# Patient Record
Sex: FEMALE | Race: WHITE | URBAN - METROPOLITAN AREA
[De-identification: names, ages, dates, MRNs, and addresses within clinical notes are randomized per-mention and may not be internally consistent; named-entity substitution may affect disease eponyms.]

---

## 2016-07-14 PROBLEM — Z98.890: Noted: 2019-06-25

## 2016-07-14 PROBLEM — H02.834: Noted: 2019-06-25

## 2016-07-14 PROBLEM — H02.831: Noted: 2019-06-25

## 2017-01-18 ENCOUNTER — IMPORTED ENCOUNTER (OUTPATIENT)
Dept: URBAN - METROPOLITAN AREA CLINIC 50 | Facility: CLINIC | Age: 77
End: 2017-01-18

## 2017-07-25 ENCOUNTER — IMPORTED ENCOUNTER (OUTPATIENT)
Dept: URBAN - METROPOLITAN AREA CLINIC 50 | Facility: CLINIC | Age: 77
End: 2017-07-25

## 2018-05-17 ENCOUNTER — IMPORTED ENCOUNTER (OUTPATIENT)
Dept: URBAN - METROPOLITAN AREA CLINIC 50 | Facility: CLINIC | Age: 78
End: 2018-05-17

## 2018-07-03 ENCOUNTER — IMPORTED ENCOUNTER (OUTPATIENT)
Dept: URBAN - METROPOLITAN AREA CLINIC 50 | Facility: CLINIC | Age: 78
End: 2018-07-03

## 2019-01-07 ENCOUNTER — IMPORTED ENCOUNTER (OUTPATIENT)
Dept: URBAN - METROPOLITAN AREA CLINIC 50 | Facility: CLINIC | Age: 79
End: 2019-01-07

## 2019-01-07 NOTE — PATIENT DISCUSSION
"""Continue Artificial tears both eyes two - four times a day ."" ""Stop Restasis both eyes twice a day . """

## 2019-01-30 ENCOUNTER — IMPORTED ENCOUNTER (OUTPATIENT)
Dept: URBAN - METROPOLITAN AREA CLINIC 50 | Facility: CLINIC | Age: 79
End: 2019-01-30

## 2019-03-20 ENCOUNTER — IMPORTED ENCOUNTER (OUTPATIENT)
Dept: URBAN - METROPOLITAN AREA CLINIC 50 | Facility: CLINIC | Age: 79
End: 2019-03-20

## 2019-03-27 ENCOUNTER — IMPORTED ENCOUNTER (OUTPATIENT)
Dept: URBAN - METROPOLITAN AREA CLINIC 50 | Facility: CLINIC | Age: 79
End: 2019-03-27

## 2019-04-12 ENCOUNTER — IMPORTED ENCOUNTER (OUTPATIENT)
Dept: URBAN - METROPOLITAN AREA CLINIC 50 | Facility: CLINIC | Age: 79
End: 2019-04-12

## 2019-06-18 ENCOUNTER — IMPORTED ENCOUNTER (OUTPATIENT)
Dept: URBAN - METROPOLITAN AREA CLINIC 50 | Facility: CLINIC | Age: 79
End: 2019-06-18

## 2019-06-20 ENCOUNTER — IMPORTED ENCOUNTER (OUTPATIENT)
Dept: URBAN - METROPOLITAN AREA CLINIC 50 | Facility: CLINIC | Age: 79
End: 2019-06-20

## 2019-06-25 PROBLEM — Z98.890: Noted: 2019-06-25

## 2019-07-02 ENCOUNTER — IMPORTED ENCOUNTER (OUTPATIENT)
Dept: URBAN - METROPOLITAN AREA CLINIC 50 | Facility: CLINIC | Age: 79
End: 2019-07-02

## 2019-08-07 ENCOUNTER — IMPORTED ENCOUNTER (OUTPATIENT)
Dept: URBAN - METROPOLITAN AREA CLINIC 50 | Facility: CLINIC | Age: 79
End: 2019-08-07

## 2019-10-14 ENCOUNTER — IMPORTED ENCOUNTER (OUTPATIENT)
Dept: URBAN - METROPOLITAN AREA CLINIC 50 | Facility: CLINIC | Age: 79
End: 2019-10-14

## 2019-10-14 NOTE — PATIENT DISCUSSION
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 Patient to call if vision changes or distortion increases.  Good diet/do not smoke."" ""Continue lutein 10/20mg ."" ""OCT Macula: OD: Stable

## 2020-09-18 ENCOUNTER — IMPORTED ENCOUNTER (OUTPATIENT)
Dept: URBAN - METROPOLITAN AREA CLINIC 50 | Facility: CLINIC | Age: 80
End: 2020-09-18

## 2020-09-24 ENCOUNTER — IMPORTED ENCOUNTER (OUTPATIENT)
Dept: URBAN - METROPOLITAN AREA CLINIC 50 | Facility: CLINIC | Age: 80
End: 2020-09-24

## 2020-11-19 ENCOUNTER — IMPORTED ENCOUNTER (OUTPATIENT)
Dept: URBAN - METROPOLITAN AREA CLINIC 50 | Facility: CLINIC | Age: 80
End: 2020-11-19

## 2021-05-14 ASSESSMENT — VISUAL ACUITY
OD_OTHER: 20/>400.
OS_CC: J1@ 14 IN
OD_SC: 20/25+2
OS_CC: J1+
OD_SC: 20/25
OD_CC: J1(+)
OS_SC: 20/20
OD_SC: 20/25-
OS_SC: 20/25-
OD_CC: J1+
OS_SC: 20/25-2
OS_SC: 20/20-
OS_CC: J1(+)
OD_SC: 20/25-
OD_BAT: 20/>400
OD_SC: 20/25-
OS_SC: 20/25-1
OS_CC: J1+
OS_SC: 20/30
OS_SC: 20/25-
OS_SC: 20/30-1
OS_SC: 20/25-1+1
OD_CC: J1
OD_SC: 20/30+1
OD_SC: 20/30-1
OD_SC: 20/25-
OD_SC: 20/25-2
OD_SC: 20/30-1
OS_CC: J1
OD_CC: J1@ 14 IN
OS_SC: 20/25+1
OD_CC: J1+

## 2021-05-14 ASSESSMENT — TONOMETRY
OD_IOP_MMHG: 15
OD_IOP_MMHG: 14
OS_IOP_MMHG: 14
OD_IOP_MMHG: 12
OS_IOP_MMHG: 15
OD_IOP_MMHG: 14
OD_IOP_MMHG: 16
OS_IOP_MMHG: 14
OS_IOP_MMHG: 15
OS_IOP_MMHG: 15
OS_IOP_MMHG: 13
OD_IOP_MMHG: 14
OS_IOP_MMHG: 14
OS_IOP_MMHG: 14

## 2021-11-16 ENCOUNTER — PREPPED CHART (OUTPATIENT)
Dept: URBAN - METROPOLITAN AREA CLINIC 53 | Facility: CLINIC | Age: 81
End: 2021-11-16

## 2021-11-18 ENCOUNTER — ANNUAL COMPREHENSIVE EXAM (OUTPATIENT)
Dept: URBAN - METROPOLITAN AREA CLINIC 53 | Facility: CLINIC | Age: 81
End: 2021-11-18

## 2021-11-18 DIAGNOSIS — H26.491: ICD-10-CM

## 2021-11-18 DIAGNOSIS — E11.9: ICD-10-CM

## 2021-11-18 DIAGNOSIS — H16.223: ICD-10-CM

## 2021-11-18 DIAGNOSIS — H35.3132: ICD-10-CM

## 2021-11-18 DIAGNOSIS — H43.813: ICD-10-CM

## 2021-11-18 PROCEDURE — 92014 COMPRE OPH EXAM EST PT 1/>: CPT

## 2021-11-18 PROCEDURE — 92134 CPTRZ OPH DX IMG PST SGM RTA: CPT

## 2021-11-18 ASSESSMENT — TONOMETRY
OS_IOP_MMHG: 14
OD_IOP_MMHG: 15

## 2021-11-18 ASSESSMENT — VISUAL ACUITY
OD_SC: J1+
OD_GLARE: 20/40
OS_SC: 20/20
OD_SC: 20/20
OD_GLARE: 20/30
OS_SC: J1+

## 2022-12-01 ENCOUNTER — COMPREHENSIVE EXAM (OUTPATIENT)
Dept: URBAN - METROPOLITAN AREA CLINIC 53 | Facility: CLINIC | Age: 82
End: 2022-12-01

## 2022-12-01 PROCEDURE — 92014 COMPRE OPH EXAM EST PT 1/>: CPT

## 2022-12-01 PROCEDURE — 92134 CPTRZ OPH DX IMG PST SGM RTA: CPT

## 2022-12-01 RX ORDER — LIFITEGRAST 50 MG/ML
1 SOLUTION/ DROPS OPHTHALMIC TWICE A DAY
Start: 2022-12-01

## 2022-12-01 RX ORDER — LOTEPREDNOL ETABONATE 2.5 MG/ML
1 SUSPENSION/ DROPS OPHTHALMIC TWICE A DAY
Start: 2022-12-01

## 2022-12-01 ASSESSMENT — TONOMETRY
OD_IOP_MMHG: 14
OS_IOP_MMHG: 14

## 2022-12-01 ASSESSMENT — VISUAL ACUITY
OD_GLARE: 20/25
OU_SC: 20/20
OS_SC: 20/20
OD_SC: 20/20
OU_SC: J1+
OD_GLARE: 20/20

## 2023-12-01 ENCOUNTER — COMPREHENSIVE EXAM (OUTPATIENT)
Dept: URBAN - METROPOLITAN AREA CLINIC 53 | Facility: CLINIC | Age: 83
End: 2023-12-01

## 2023-12-01 DIAGNOSIS — H52.4: ICD-10-CM

## 2023-12-01 DIAGNOSIS — H35.3132: ICD-10-CM

## 2023-12-01 DIAGNOSIS — H16.223: ICD-10-CM

## 2023-12-01 DIAGNOSIS — E11.9: ICD-10-CM

## 2023-12-01 DIAGNOSIS — H26.491: ICD-10-CM

## 2023-12-01 DIAGNOSIS — H43.813: ICD-10-CM

## 2023-12-01 DIAGNOSIS — H18.513: ICD-10-CM

## 2023-12-01 PROCEDURE — 99214 OFFICE O/P EST MOD 30 MIN: CPT

## 2023-12-01 PROCEDURE — 92015 DETERMINE REFRACTIVE STATE: CPT

## 2023-12-01 PROCEDURE — 92134 CPTRZ OPH DX IMG PST SGM RTA: CPT

## 2023-12-01 ASSESSMENT — VISUAL ACUITY
OS_SC: 20/30-1
OU_SC: J1@15
OD_SC: 20/25
OS_PH: 20/25
OS_GLARE: 20/25
OS_GLARE: 20/25

## 2023-12-01 ASSESSMENT — KERATOMETRY
OD_K1POWER_DIOPTERS: 39.75
OS_AXISANGLE2_DEGREES: 63
OS_AXISANGLE_DEGREES: 153
OD_AXISANGLE2_DEGREES: 136
OD_AXISANGLE_DEGREES: 46
OS_K1POWER_DIOPTERS: 40.50
OD_K2POWER_DIOPTERS: 40.75
OS_K2POWER_DIOPTERS: 40.75

## 2023-12-01 ASSESSMENT — TONOMETRY
OS_IOP_MMHG: 16
OD_IOP_MMHG: 16

## 2024-09-20 ENCOUNTER — FOLLOW UP (OUTPATIENT)
Dept: URBAN - METROPOLITAN AREA CLINIC 53 | Facility: CLINIC | Age: 84
End: 2024-09-20

## 2024-09-20 DIAGNOSIS — H16.223: ICD-10-CM

## 2024-09-20 PROCEDURE — 99213 OFFICE O/P EST LOW 20 MIN: CPT

## 2024-09-20 RX ORDER — OLOPATADINE HYDROCHLORIDE 2 MG/ML
1 SOLUTION OPHTHALMIC ONCE A DAY
Start: 2024-09-20

## 2025-01-09 ENCOUNTER — COMPREHENSIVE EXAM (OUTPATIENT)
Age: 85
End: 2025-01-09

## 2025-01-09 DIAGNOSIS — H35.3131: ICD-10-CM

## 2025-01-09 DIAGNOSIS — H16.223: ICD-10-CM

## 2025-01-09 DIAGNOSIS — H52.4: ICD-10-CM

## 2025-01-09 PROCEDURE — 99214 OFFICE O/P EST MOD 30 MIN: CPT

## 2025-06-24 NOTE — PATIENT DISCUSSION
"""DAREK early chalazion. "" ""Start Warm compresses left eye three times a day
[FreeTextEntry3] : hyperpigmented patches on trunk, extremities including wrists, dorsal hands, ankles xerosis cutis